# Patient Record
Sex: MALE | Race: WHITE | NOT HISPANIC OR LATINO | URBAN - METROPOLITAN AREA
[De-identification: names, ages, dates, MRNs, and addresses within clinical notes are randomized per-mention and may not be internally consistent; named-entity substitution may affect disease eponyms.]

---

## 2017-01-09 PROBLEM — D04.39 CARCINOMA IN SITU OF SKIN OF OTHER PARTS OF FACE: Status: ACTIVE | Noted: 2017-01-09

## 2017-05-29 PROBLEM — D49.2 NEOPLASM OF UNSPECIFIED BEHAVIOR OF BONE, SOFT TISSUE, AND SKIN: Status: RESOLVED | Noted: 2017-05-15 | Resolved: 2017-05-29

## 2017-10-23 ENCOUNTER — APPOINTMENT (RX ONLY)
Dept: URBAN - METROPOLITAN AREA CLINIC 4 | Facility: CLINIC | Age: 82
Setting detail: DERMATOLOGY
End: 2017-10-23

## 2017-10-23 DIAGNOSIS — L21.8 OTHER SEBORRHEIC DERMATITIS: ICD-10-CM

## 2017-10-23 DIAGNOSIS — L57.0 ACTINIC KERATOSIS: ICD-10-CM

## 2017-10-23 DIAGNOSIS — D485 NEOPLASM OF UNCERTAIN BEHAVIOR OF SKIN: ICD-10-CM

## 2017-10-23 PROBLEM — Z85.828 PERSONAL HISTORY OF OTHER MALIGNANT NEOPLASM OF SKIN: Status: ACTIVE | Noted: 2017-10-23

## 2017-10-23 PROBLEM — D48.5 NEOPLASM OF UNCERTAIN BEHAVIOR OF SKIN: Status: ACTIVE | Noted: 2017-10-23

## 2017-10-23 PROCEDURE — ? PRESCRIPTION SAMPLES PROVIDED

## 2017-10-23 PROCEDURE — 17003 DESTRUCT PREMALG LES 2-14: CPT

## 2017-10-23 PROCEDURE — 99213 OFFICE O/P EST LOW 20 MIN: CPT | Mod: 25

## 2017-10-23 PROCEDURE — 17000 DESTRUCT PREMALG LESION: CPT

## 2017-10-23 PROCEDURE — 11100: CPT | Mod: 59

## 2017-10-23 PROCEDURE — ? BIOPSY BY SHAVE METHOD

## 2017-10-23 PROCEDURE — ? LIQUID NITROGEN

## 2017-10-23 ASSESSMENT — LOCATION DETAILED DESCRIPTION DERM
LOCATION DETAILED: LEFT MEDIAL FOREHEAD
LOCATION DETAILED: RIGHT MEDIAL FRONTAL SCALP
LOCATION DETAILED: LEFT SUPERIOR FOREHEAD
LOCATION DETAILED: RIGHT INFERIOR CENTRAL MALAR CHEEK
LOCATION DETAILED: LEFT MEDIAL FRONTAL SCALP
LOCATION DETAILED: LEFT SUPERIOR PARIETAL SCALP
LOCATION DETAILED: RIGHT INFERIOR LATERAL MALAR CHEEK
LOCATION DETAILED: RIGHT SUPERIOR PARIETAL SCALP
LOCATION DETAILED: RIGHT FOREHEAD
LOCATION DETAILED: RIGHT CENTRAL FRONTAL SCALP
LOCATION DETAILED: RIGHT LATERAL MALAR CHEEK
LOCATION DETAILED: RIGHT CRUS OF HELIX

## 2017-10-23 ASSESSMENT — LOCATION SIMPLE DESCRIPTION DERM
LOCATION SIMPLE: LEFT FOREHEAD
LOCATION SIMPLE: SCALP
LOCATION SIMPLE: RIGHT SCALP
LOCATION SIMPLE: RIGHT FOREHEAD
LOCATION SIMPLE: LEFT SCALP
LOCATION SIMPLE: RIGHT EAR
LOCATION SIMPLE: RIGHT CHEEK

## 2017-10-23 ASSESSMENT — LOCATION ZONE DERM
LOCATION ZONE: SCALP
LOCATION ZONE: EAR
LOCATION ZONE: FACE

## 2017-10-23 NOTE — PROCEDURE: LIQUID NITROGEN
Detail Level: Detailed
Duration Of Freeze Thaw-Cycle (Seconds): 3
Post-Care Instructions: I reviewed with the patient in detail post-care instructions. Patient is to wear sunprotection, and avoid picking at any of the treated lesions. Pt may apply Vaseline to crusted or scabbing areas.
Consent: The patient's consent was obtained including but not limited to risks of crusting, scabbing, blistering, scarring, darker or lighter pigmentary change, recurrence, incomplete removal and infection.
Render Post-Care Instructions In Note?: no
Number Of Freeze-Thaw Cycles: 2 freeze-thaw cycles

## 2017-10-23 NOTE — PROCEDURE: BIOPSY BY SHAVE METHOD
Bill For Surgical Tray: no
Detail Level: Detailed
Dressing: bandage
Anesthesia Type: 1% lidocaine with epinephrine
Curettage Text: The wound bed was treated with curettage after the biopsy was performed.
X Size Of Lesion In Cm: 0
Lab Facility: 
Biopsy Type: H and E
Type Of Destruction Used: Electrodesiccation and Curettage
Wound Care: Vaseline
Anesthesia Volume In Cc: 0.5
Cryotherapy Text: The wound bed was treated with cryotherapy after the biopsy was performed.
Consent: Written consent was obtained and risks were reviewed including but not limited to scarring, infection, bleeding, scabbing, incomplete removal, nerve damage and allergy to anesthesia.
Hemostasis: Aluminum Chloride and Electrocautery
Electrodesiccation Text: The wound bed was treated with electrodesiccation after the biopsy was performed.
Silver Nitrate Text: The wound bed was treated with silver nitrate after the biopsy was performed.
Biopsy Method: Personna blade
Electrodesiccation And Curettage Text: The wound bed was treated with electrodesiccation and curettage after the biopsy was performed.
Billing Type: Third-Party Bill
Post-Care Instructions: I reviewed with the patient in detail post-care instructions. Patient is to keep the biopsy site dry overnight, and then apply bacitracin twice daily until healed. Patient may apply hydrogen peroxide soaks to remove any crusting.
Destruction After The Procedure: Yes
Notification Instructions: Patient will be notified of biopsy results. However, patient instructed to call the office if not contacted within 2 weeks.
Lab: [id number]

## 2017-10-23 NOTE — HPI: SKIN LESION (SQUAMOUS CELL CARCINOMA)
How Severe Is Your Skin Cancer?: mild
Is This A New Presentation, Or A Follow-Up?: Follow Up Squamous Cell Carcinoma
Additional History: Treated with ED&C at the time of biopsy.
Location From Outside Provider (Will Override Previously Chosen Location): Right frontal scalp Superior
When Was Squamous Cell Carcinoma Biopsied? (Optional): 5/15/2017
Accession # (Optional): [id number]

## 2017-10-23 NOTE — HPI: SKIN LESION (ACTINIC KERATOSES)
How Severe Are They?: mild
Is This A New Presentation, Or A Follow-Up?: Follow Up Actinic Keratoses
Additional History: Right frontal scalp inferior [id number], treated with ED&C at the time of biopsy.

## 2017-12-05 ENCOUNTER — APPOINTMENT (RX ONLY)
Dept: URBAN - METROPOLITAN AREA CLINIC 4 | Facility: CLINIC | Age: 82
Setting detail: DERMATOLOGY
End: 2017-12-05

## 2017-12-05 DIAGNOSIS — L57.0 ACTINIC KERATOSIS: ICD-10-CM

## 2017-12-05 PROCEDURE — ? LIQUID NITROGEN

## 2017-12-05 PROCEDURE — 17003 DESTRUCT PREMALG LES 2-14: CPT

## 2017-12-05 PROCEDURE — 17000 DESTRUCT PREMALG LESION: CPT

## 2017-12-05 ASSESSMENT — LOCATION DETAILED DESCRIPTION DERM
LOCATION DETAILED: LEFT SUPERIOR MEDIAL FOREHEAD
LOCATION DETAILED: RIGHT SUPERIOR MEDIAL FOREHEAD
LOCATION DETAILED: RIGHT MEDIAL FRONTAL SCALP
LOCATION DETAILED: RIGHT LATERAL MALAR CHEEK
LOCATION DETAILED: RIGHT CENTRAL FRONTAL SCALP

## 2017-12-05 ASSESSMENT — LOCATION SIMPLE DESCRIPTION DERM
LOCATION SIMPLE: LEFT FOREHEAD
LOCATION SIMPLE: RIGHT SCALP
LOCATION SIMPLE: RIGHT CHEEK
LOCATION SIMPLE: RIGHT FOREHEAD

## 2017-12-05 ASSESSMENT — LOCATION ZONE DERM
LOCATION ZONE: FACE
LOCATION ZONE: SCALP

## 2017-12-05 NOTE — PROCEDURE: LIQUID NITROGEN
Duration Of Freeze Thaw-Cycle (Seconds): 5
Render Post-Care Instructions In Note?: no
Consent: The patient's consent was obtained including but not limited to risks of crusting, scabbing, blistering, scarring, darker or lighter pigmentary change, recurrence, incomplete removal and infection.
Post-Care Instructions: I reviewed with the patient in detail post-care instructions. Patient is to wear sunprotection, and avoid picking at any of the treated lesions. Pt may apply Vaseline to crusted or scabbing areas.
Detail Level: Detailed
Number Of Freeze-Thaw Cycles: 2 freeze-thaw cycles
Number Of Freeze-Thaw Cycles: 1 freeze-thaw cycle

## 2018-01-01 ENCOUNTER — APPOINTMENT (OUTPATIENT)
Dept: HOSPICE | Facility: HOSPICE | Age: 83
End: 2018-01-01
Payer: MEDICARE

## 2018-01-01 ENCOUNTER — APPOINTMENT (OUTPATIENT)
Dept: RADIOLOGY | Facility: MEDICAL CENTER | Age: 83
End: 2018-01-01
Attending: EMERGENCY MEDICINE
Payer: MEDICARE

## 2018-01-01 ENCOUNTER — HOME CARE VISIT (OUTPATIENT)
Dept: HOSPICE | Facility: HOSPICE | Age: 83
End: 2018-01-01
Payer: MEDICARE

## 2018-01-01 ENCOUNTER — HOSPICE ADMISSION (OUTPATIENT)
Dept: HOSPICE | Facility: HOSPICE | Age: 83
End: 2018-01-01
Payer: MEDICARE

## 2018-01-01 ENCOUNTER — HOSPITAL ENCOUNTER (EMERGENCY)
Facility: MEDICAL CENTER | Age: 83
End: 2018-10-22
Attending: EMERGENCY MEDICINE
Payer: MEDICARE

## 2018-01-01 ENCOUNTER — HOSPITAL ENCOUNTER (OUTPATIENT)
Dept: LAB | Facility: MEDICAL CENTER | Age: 83
End: 2018-12-06
Attending: INTERNAL MEDICINE
Payer: COMMERCIAL

## 2018-01-01 ENCOUNTER — HOSPITAL ENCOUNTER (OUTPATIENT)
Dept: LAB | Facility: MEDICAL CENTER | Age: 83
End: 2018-08-11
Attending: NURSE PRACTITIONER
Payer: MEDICARE

## 2018-01-01 VITALS — HEART RATE: 75 BPM | RESPIRATION RATE: 22 BRPM

## 2018-01-01 VITALS — RESPIRATION RATE: 32 BRPM

## 2018-01-01 VITALS — HEART RATE: 72 BPM | SYSTOLIC BLOOD PRESSURE: 130 MMHG | RESPIRATION RATE: 28 BRPM | DIASTOLIC BLOOD PRESSURE: 74 MMHG

## 2018-01-01 VITALS — DIASTOLIC BLOOD PRESSURE: 76 MMHG | HEART RATE: 84 BPM | RESPIRATION RATE: 36 BRPM | SYSTOLIC BLOOD PRESSURE: 128 MMHG

## 2018-01-01 VITALS
RESPIRATION RATE: 24 BRPM | OXYGEN SATURATION: 84 % | SYSTOLIC BLOOD PRESSURE: 142 MMHG | HEART RATE: 78 BPM | DIASTOLIC BLOOD PRESSURE: 100 MMHG | TEMPERATURE: 98.3 F

## 2018-01-01 VITALS
OXYGEN SATURATION: 92 % | RESPIRATION RATE: 16 BRPM | HEART RATE: 80 BPM | DIASTOLIC BLOOD PRESSURE: 68 MMHG | SYSTOLIC BLOOD PRESSURE: 110 MMHG | TEMPERATURE: 101.3 F

## 2018-01-01 VITALS — DIASTOLIC BLOOD PRESSURE: 80 MMHG | SYSTOLIC BLOOD PRESSURE: 124 MMHG | HEART RATE: 78 BPM | RESPIRATION RATE: 18 BRPM

## 2018-01-01 VITALS — SYSTOLIC BLOOD PRESSURE: 110 MMHG | HEART RATE: 80 BPM | RESPIRATION RATE: 18 BRPM | DIASTOLIC BLOOD PRESSURE: 64 MMHG

## 2018-01-01 VITALS — SYSTOLIC BLOOD PRESSURE: 100 MMHG | HEART RATE: 68 BPM | DIASTOLIC BLOOD PRESSURE: 60 MMHG | RESPIRATION RATE: 20 BRPM

## 2018-01-01 VITALS — SYSTOLIC BLOOD PRESSURE: 116 MMHG | HEART RATE: 74 BPM | DIASTOLIC BLOOD PRESSURE: 68 MMHG | RESPIRATION RATE: 18 BRPM

## 2018-01-01 VITALS — RESPIRATION RATE: 18 BRPM

## 2018-01-01 VITALS — DIASTOLIC BLOOD PRESSURE: 64 MMHG | RESPIRATION RATE: 18 BRPM | SYSTOLIC BLOOD PRESSURE: 106 MMHG | HEART RATE: 86 BPM

## 2018-01-01 VITALS
OXYGEN SATURATION: 93 % | HEIGHT: 71 IN | TEMPERATURE: 98.8 F | RESPIRATION RATE: 17 BRPM | WEIGHT: 185 LBS | HEART RATE: 69 BPM | SYSTOLIC BLOOD PRESSURE: 102 MMHG | DIASTOLIC BLOOD PRESSURE: 71 MMHG | BODY MASS INDEX: 25.9 KG/M2

## 2018-01-01 VITALS — RESPIRATION RATE: 22 BRPM | HEART RATE: 78 BPM

## 2018-01-01 VITALS — RESPIRATION RATE: 18 BRPM | SYSTOLIC BLOOD PRESSURE: 140 MMHG | HEART RATE: 80 BPM | DIASTOLIC BLOOD PRESSURE: 80 MMHG

## 2018-01-01 VITALS — DIASTOLIC BLOOD PRESSURE: 72 MMHG | RESPIRATION RATE: 18 BRPM | SYSTOLIC BLOOD PRESSURE: 118 MMHG | HEART RATE: 110 BPM

## 2018-01-01 VITALS — RESPIRATION RATE: 20 BRPM | HEART RATE: 64 BPM

## 2018-01-01 VITALS — SYSTOLIC BLOOD PRESSURE: 140 MMHG | HEART RATE: 80 BPM | RESPIRATION RATE: 18 BRPM | DIASTOLIC BLOOD PRESSURE: 80 MMHG

## 2018-01-01 VITALS — RESPIRATION RATE: 16 BRPM | HEART RATE: 78 BPM

## 2018-01-01 VITALS — SYSTOLIC BLOOD PRESSURE: 130 MMHG | HEART RATE: 80 BPM | RESPIRATION RATE: 16 BRPM | DIASTOLIC BLOOD PRESSURE: 64 MMHG

## 2018-01-01 VITALS — RESPIRATION RATE: 22 BRPM | HEART RATE: 85 BPM

## 2018-01-01 VITALS — HEART RATE: 76 BPM | RESPIRATION RATE: 18 BRPM | DIASTOLIC BLOOD PRESSURE: 65 MMHG | SYSTOLIC BLOOD PRESSURE: 120 MMHG

## 2018-01-01 VITALS — HEART RATE: 78 BPM | RESPIRATION RATE: 18 BRPM | DIASTOLIC BLOOD PRESSURE: 58 MMHG | SYSTOLIC BLOOD PRESSURE: 125 MMHG

## 2018-01-01 DIAGNOSIS — R53.1 WEAKNESS: ICD-10-CM

## 2018-01-01 DIAGNOSIS — R79.89 ELEVATED TROPONIN I LEVEL: ICD-10-CM

## 2018-01-01 DIAGNOSIS — Z51.5 COMFORT MEASURES ONLY STATUS: ICD-10-CM

## 2018-01-01 DIAGNOSIS — E03.9 HYPOTHYROIDISM, UNSPECIFIED TYPE: ICD-10-CM

## 2018-01-01 LAB
25(OH)D3 SERPL-MCNC: 35 NG/ML (ref 30–100)
ALBUMIN SERPL BCP-MCNC: 3.2 G/DL (ref 3.2–4.9)
ALBUMIN SERPL BCP-MCNC: 3.6 G/DL (ref 3.2–4.9)
ALBUMIN/GLOB SERPL: 1.1 G/DL
ALBUMIN/GLOB SERPL: 1.4 G/DL
ALP SERPL-CCNC: 67 U/L (ref 30–99)
ALP SERPL-CCNC: 68 U/L (ref 30–99)
ALT SERPL-CCNC: 11 U/L (ref 2–50)
ALT SERPL-CCNC: 7 U/L (ref 2–50)
ANION GAP SERPL CALC-SCNC: 8 MMOL/L (ref 0–11.9)
ANION GAP SERPL CALC-SCNC: 8 MMOL/L (ref 0–11.9)
APPEARANCE UR: CLEAR
APTT PPP: 26.6 SEC (ref 24.7–36)
AST SERPL-CCNC: 12 U/L (ref 12–45)
AST SERPL-CCNC: 15 U/L (ref 12–45)
BASOPHILS # BLD AUTO: 0.7 % (ref 0–1.8)
BASOPHILS # BLD AUTO: 0.9 % (ref 0–1.8)
BASOPHILS # BLD: 0.04 K/UL (ref 0–0.12)
BASOPHILS # BLD: 0.08 K/UL (ref 0–0.12)
BILIRUB SERPL-MCNC: 0.6 MG/DL (ref 0.1–1.5)
BILIRUB SERPL-MCNC: 0.7 MG/DL (ref 0.1–1.5)
BNP SERPL-MCNC: 69 PG/ML (ref 0–100)
BUN SERPL-MCNC: 17 MG/DL (ref 8–22)
BUN SERPL-MCNC: 18 MG/DL (ref 8–22)
CALCIUM SERPL-MCNC: 8.6 MG/DL (ref 8.5–10.5)
CALCIUM SERPL-MCNC: 8.7 MG/DL (ref 8.5–10.5)
CHLORIDE SERPL-SCNC: 106 MMOL/L (ref 96–112)
CHLORIDE SERPL-SCNC: 106 MMOL/L (ref 96–112)
CO2 SERPL-SCNC: 25 MMOL/L (ref 20–33)
CO2 SERPL-SCNC: 27 MMOL/L (ref 20–33)
COLOR UR: YELLOW
CREAT SERPL-MCNC: 1.1 MG/DL (ref 0.5–1.4)
CREAT SERPL-MCNC: 1.18 MG/DL (ref 0.5–1.4)
EOSINOPHIL # BLD AUTO: 0.4 K/UL (ref 0–0.51)
EOSINOPHIL # BLD AUTO: 0.65 K/UL (ref 0–0.51)
EOSINOPHIL NFR BLD: 7.2 % (ref 0–6.9)
EOSINOPHIL NFR BLD: 7.3 % (ref 0–6.9)
ERYTHROCYTE [DISTWIDTH] IN BLOOD BY AUTOMATED COUNT: 47.9 FL (ref 35.9–50)
ERYTHROCYTE [DISTWIDTH] IN BLOOD BY AUTOMATED COUNT: 47.9 FL (ref 35.9–50)
GLOBULIN SER CALC-MCNC: 2.5 G/DL (ref 1.9–3.5)
GLOBULIN SER CALC-MCNC: 3 G/DL (ref 1.9–3.5)
GLUCOSE SERPL-MCNC: 112 MG/DL (ref 65–99)
GLUCOSE SERPL-MCNC: 97 MG/DL (ref 65–99)
GLUCOSE UR STRIP.AUTO-MCNC: NEGATIVE MG/DL
HCT VFR BLD AUTO: 43.5 % (ref 42–52)
HCT VFR BLD AUTO: 44.5 % (ref 42–52)
HGB BLD-MCNC: 14.3 G/DL (ref 14–18)
HGB BLD-MCNC: 15.1 G/DL (ref 14–18)
IMM GRANULOCYTES # BLD AUTO: 0.01 K/UL (ref 0–0.11)
IMM GRANULOCYTES # BLD AUTO: 0.04 K/UL (ref 0–0.11)
IMM GRANULOCYTES NFR BLD AUTO: 0.2 % (ref 0–0.9)
IMM GRANULOCYTES NFR BLD AUTO: 0.4 % (ref 0–0.9)
INR PPP: 1.14 (ref 0.87–1.13)
KETONES UR STRIP.AUTO-MCNC: NEGATIVE MG/DL
LEUKOCYTE ESTERASE UR QL STRIP.AUTO: NEGATIVE
LYMPHOCYTES # BLD AUTO: 0.75 K/UL (ref 1–4.8)
LYMPHOCYTES # BLD AUTO: 0.83 K/UL (ref 1–4.8)
LYMPHOCYTES NFR BLD: 13.7 % (ref 22–41)
LYMPHOCYTES NFR BLD: 9.1 % (ref 22–41)
MCH RBC QN AUTO: 30.6 PG (ref 27–33)
MCH RBC QN AUTO: 31.1 PG (ref 27–33)
MCHC RBC AUTO-ENTMCNC: 32.9 G/DL (ref 33.7–35.3)
MCHC RBC AUTO-ENTMCNC: 33.9 G/DL (ref 33.7–35.3)
MCV RBC AUTO: 91.6 FL (ref 81.4–97.8)
MCV RBC AUTO: 92.9 FL (ref 81.4–97.8)
MICRO URNS: NORMAL
MONOCYTES # BLD AUTO: 0.93 K/UL (ref 0–0.85)
MONOCYTES # BLD AUTO: 1.85 K/UL (ref 0–0.85)
MONOCYTES NFR BLD AUTO: 17 % (ref 0–13.4)
MONOCYTES NFR BLD AUTO: 20.4 % (ref 0–13.4)
NEUTROPHILS # BLD AUTO: 3.35 K/UL (ref 1.82–7.42)
NEUTROPHILS # BLD AUTO: 5.64 K/UL (ref 1.82–7.42)
NEUTROPHILS NFR BLD: 61.1 % (ref 44–72)
NEUTROPHILS NFR BLD: 62 % (ref 44–72)
NITRITE UR QL STRIP.AUTO: NEGATIVE
NRBC # BLD AUTO: 0 K/UL
NRBC # BLD AUTO: 0 K/UL
NRBC BLD-RTO: 0 /100 WBC
NRBC BLD-RTO: 0 /100 WBC
PH UR STRIP.AUTO: 6 [PH]
PLATELET # BLD AUTO: 203 K/UL (ref 164–446)
PLATELET # BLD AUTO: 217 K/UL (ref 164–446)
PMV BLD AUTO: 9.9 FL (ref 9–12.9)
PMV BLD AUTO: 9.9 FL (ref 9–12.9)
POTASSIUM SERPL-SCNC: 3.9 MMOL/L (ref 3.6–5.5)
POTASSIUM SERPL-SCNC: 4.2 MMOL/L (ref 3.6–5.5)
PROT SERPL-MCNC: 6.1 G/DL (ref 6–8.2)
PROT SERPL-MCNC: 6.2 G/DL (ref 6–8.2)
PROT UR QL STRIP: NEGATIVE MG/DL
PROTHROMBIN TIME: 14.8 SEC (ref 12–14.6)
RBC # BLD AUTO: 4.68 M/UL (ref 4.7–6.1)
RBC # BLD AUTO: 4.86 M/UL (ref 4.7–6.1)
RBC UR QL AUTO: NEGATIVE
SODIUM SERPL-SCNC: 139 MMOL/L (ref 135–145)
SODIUM SERPL-SCNC: 141 MMOL/L (ref 135–145)
SP GR UR STRIP.AUTO: 1.03
TROPONIN I SERPL-MCNC: 0.18 NG/ML (ref 0–0.04)
TSH SERPL DL<=0.005 MIU/L-ACNC: 12.87 UIU/ML (ref 0.38–5.33)
TSH SERPL DL<=0.005 MIU/L-ACNC: 2.14 UIU/ML (ref 0.38–5.33)
VIT B12 SERPL-MCNC: 703 PG/ML (ref 211–911)
WBC # BLD AUTO: 5.5 K/UL (ref 4.8–10.8)
WBC # BLD AUTO: 9.1 K/UL (ref 4.8–10.8)

## 2018-01-01 PROCEDURE — S9126 HOSPICE CARE, IN THE HOME, P: HCPCS

## 2018-01-01 PROCEDURE — G0299 HHS/HOSPICE OF RN EA 15 MIN: HCPCS

## 2018-01-01 PROCEDURE — 80053 COMPREHEN METABOLIC PANEL: CPT

## 2018-01-01 PROCEDURE — G0155 HHCP-SVS OF CSW,EA 15 MIN: HCPCS

## 2018-01-01 PROCEDURE — G0156 HHCP-SVS OF AIDE,EA 15 MIN: HCPCS

## 2018-01-01 PROCEDURE — 81003 URINALYSIS AUTO W/O SCOPE: CPT

## 2018-01-01 PROCEDURE — 99284 EMERGENCY DEPT VISIT MOD MDM: CPT | Mod: 25

## 2018-01-01 PROCEDURE — 82607 VITAMIN B-12: CPT

## 2018-01-01 PROCEDURE — 83880 ASSAY OF NATRIURETIC PEPTIDE: CPT

## 2018-01-01 PROCEDURE — 665036 HSPC NOTICE OF ELECTION NOE

## 2018-01-01 PROCEDURE — 82306 VITAMIN D 25 HYDROXY: CPT | Mod: GZ

## 2018-01-01 PROCEDURE — 85730 THROMBOPLASTIN TIME PARTIAL: CPT

## 2018-01-01 PROCEDURE — 85025 COMPLETE CBC W/AUTO DIFF WBC: CPT

## 2018-01-01 PROCEDURE — 36415 COLL VENOUS BLD VENIPUNCTURE: CPT

## 2018-01-01 PROCEDURE — 304561 HCHG STAT O2

## 2018-01-01 PROCEDURE — 84443 ASSAY THYROID STIM HORMONE: CPT

## 2018-01-01 PROCEDURE — 84484 ASSAY OF TROPONIN QUANT: CPT

## 2018-01-01 PROCEDURE — 70450 CT HEAD/BRAIN W/O DYE: CPT

## 2018-01-01 PROCEDURE — 6650990 HC HOSPICE AND HOME CARE RX REV CODE 0250

## 2018-01-01 PROCEDURE — 71045 X-RAY EXAM CHEST 1 VIEW: CPT

## 2018-01-01 PROCEDURE — 85610 PROTHROMBIN TIME: CPT

## 2018-01-01 RX ORDER — CHOLESTYRAMINE LIGHT 4 G/5.7G
POWDER, FOR SUSPENSION ORAL 2 TIMES DAILY
COMMUNITY
End: 2018-01-01

## 2018-01-01 RX ORDER — LEVOTHYROXINE SODIUM 137 UG/1
137 TABLET ORAL
COMMUNITY
End: 2019-01-01

## 2018-01-01 RX ORDER — ASPIRIN 81 MG/1
81 TABLET, CHEWABLE ORAL DAILY
COMMUNITY
End: 2018-01-01

## 2018-01-01 RX ORDER — UBIDECARENONE 75 MG
100 CAPSULE ORAL DAILY
COMMUNITY
End: 2018-01-01

## 2018-01-01 RX ORDER — FLURAZEPAM HCL 15 MG
15 CAPSULE ORAL NIGHTLY PRN
COMMUNITY
End: 2019-01-01

## 2018-01-01 RX ORDER — DOCUSATE SODIUM 100 MG/1
100 CAPSULE, LIQUID FILLED ORAL 2 TIMES DAILY
COMMUNITY
End: 2018-01-01

## 2018-01-01 RX ORDER — ALPRAZOLAM 0.25 MG/1
0.25 TABLET ORAL NIGHTLY PRN
COMMUNITY
End: 2019-01-01

## 2018-01-01 RX ORDER — IBUPROFEN 800 MG/1
800 TABLET ORAL EVERY 8 HOURS PRN
COMMUNITY
End: 2019-01-01

## 2018-01-01 RX ORDER — FINASTERIDE 5 MG/1
5 TABLET, FILM COATED ORAL DAILY
COMMUNITY
End: 2018-01-01

## 2018-01-01 RX ORDER — KETOCONAZOLE 20 MG/G
1 CREAM TOPICAL 2 TIMES DAILY
COMMUNITY

## 2018-01-01 RX ORDER — ONDANSETRON 4 MG/1
4 TABLET, ORALLY DISINTEGRATING ORAL EVERY 6 HOURS PRN
COMMUNITY

## 2018-01-01 RX ORDER — HYDROCODONE BITARTRATE AND ACETAMINOPHEN 10; 325 MG/1; MG/1
1 TABLET ORAL EVERY 6 HOURS PRN
COMMUNITY
End: 2018-01-01

## 2018-01-01 RX ORDER — CHLORHEXIDINE GLUCONATE ORAL RINSE 1.2 MG/ML
15 SOLUTION DENTAL 2 TIMES DAILY
COMMUNITY
End: 2018-01-01

## 2018-01-01 RX ORDER — TAMSULOSIN HYDROCHLORIDE 0.4 MG/1
0.4 CAPSULE ORAL
COMMUNITY
End: 2018-01-01

## 2018-01-01 RX ORDER — TRAZODONE HYDROCHLORIDE 100 MG/1
100 TABLET ORAL
COMMUNITY
End: 2019-01-01

## 2018-01-01 SDOH — ECONOMIC STABILITY: HOUSING INSECURITY: UNSAFE APPLIANCES: 0

## 2018-01-01 SDOH — ECONOMIC STABILITY: HOUSING INSECURITY: UNSAFE COOKING RANGE AREA: 0

## 2018-01-01 SDOH — ECONOMIC STABILITY: HOUSING INSECURITY: HOME SAFETY: N TUBING

## 2018-01-01 SDOH — ECONOMIC STABILITY: HOUSING INSECURITY
HOME SAFETY: OXYGEN ASSESSMENT COMPLETED.   THE FOLLOWING OXYGEN SAFETY EDUCATION WAS PROVIDED:  NO OPEN FLAMES  POST 'OXYGEN IN USE' SIGN ON EXTERNAL DOOR OF HOME  NEED FUNCTIONAL FIRE EXTINGUISHER AND SMOKE DETECTORS IN HOME  KEEP LIQUIDS THAT MAY CATCH FIRE AW

## 2018-01-01 SDOH — ECONOMIC STABILITY: GENERAL

## 2018-01-01 SDOH — ECONOMIC STABILITY: HOUSING INSECURITY
HOME SAFETY: AY FROM YOUR OXYGEN. THIS INCLUDES CLEANING PRODUCTS THAT CONTAIN OIL, GREASE, ALCOHOL, OR OTHER LIQUIDS THAT CAN BURN  DO NOT USE VASELINE OR OTHER PETROLEUM-BASED CREAMS AND LOTIONS ON YOUR FACE OR UPPER PART OF YOUR BODY  AVOID TRIPPING OVER OXYGE

## 2018-01-01 ASSESSMENT — ENCOUNTER SYMPTOMS
LOWER EXTREMITY EDEMA: 1
BOWEL INCONTINENCE: 1
BOWEL INCONTINENCE: 1
SPUTUM PRODUCTION: 1
FATIGUES EASILY: 1
LOWER EXTREMITY EDEMA: 1
DYSPNEA ON EXERTION: 1
STOOL FREQUENCY: LESS THAN DAILY
FORGETFULNESS: 1
COUGH CHARACTERISTICS: PRODUCTIVE
SPUTUM PRODUCTION: 1
COUGH: 1
SOMNOLENCE: 1
FATIGUES EASILY: 1
DYSPNEA ON EXERTION: 1
FATIGUES EASILY: 1
SPUTUM AMOUNT: MODERATE
DESCRIPTION OF MEMORY LOSS: SHORT TERM
DYSPNEA ON EXERTION: 1
DEPRESSED MOOD: 1
FORGETFULNESS: 1
SHORTNESS OF BREATH: 1
BOWEL INCONTINENCE: 1
COUGH: 1
SPUTUM COLOR: CLEAR
SPUTUM AMOUNT: MODERATE
FATIGUES EASILY: 1
SPUTUM PRODUCTION: 1
COUGH CHARACTERISTICS: STRONG
COUGH: 1
COUGH CHARACTERISTICS: PRODUCTIVE
BOWEL INCONTINENCE: 1
FORGETFULNESS: 1
DESCRIPTION OF MEMORY LOSS: SHORT TERM
BOWEL INCONTINENCE: 1
FATIGUES EASILY: 1
SPUTUM COLOR: CLEAR
LOWER EXTREMITY EDEMA: 1
STOOL FREQUENCY: LESS THAN DAILY
BOWEL INCONTINENCE: 1
STOOL FREQUENCY: LESS THAN DAILY
DEPRESSED MOOD: 1
BOWEL INCONTINENCE: 1
COUGH: 1
DESCRIPTION OF MEMORY LOSS: SHORT TERM
SPUTUM CONSISTENCY: THICK
FATIGUES EASILY: 1
SLEEP QUALITY: ADEQUATE
SPUTUM COLOR: CLEAR
SOMNOLENCE: 1
SLEEP QUALITY: ADEQUATE
SOMNOLENCE: 1
SOMNOLENCE: 1
COUGH CHARACTERISTICS: PRODUCTIVE
FATIGUE: 1
COUGH CHARACTERISTICS: PRODUCTIVE
FATIGUE: 1
INTRACTABLE COUGH: 1
SLEEP QUALITY: FAIR
SLEEP QUALITY: ADEQUATE
SPUTUM PRODUCTION: 1
FATIGUE: 1
FATIGUE: 1
SPUTUM COLOR: CLEAR
SPUTUM AMOUNT: MODERATE
COUGH CHARACTERISTICS: CONGESTED
DYSPNEA ACTIVITY LEVEL: WHILE SPEAKING
FATIGUES EASILY: 1
LOWER EXTREMITY EDEMA: 1
SPUTUM COLOR: CLEAR
FATIGUES EASILY: 1
SHORTNESS OF BREATH: 1
SPUTUM AMOUNT: COPIOUS
COUGH CHARACTERISTICS: PRODUCTIVE
DYSPNEA ACTIVITY LEVEL: AT REST
DESCRIPTION OF MEMORY LOSS: SHORT TERM
SHORTNESS OF BREATH: 1
SPUTUM PRODUCTION: 1
FATIGUE: 1
SPUTUM COLOR: WHITE
SPUTUM CONSISTENCY: THICK
FORGETFULNESS: 1
SLEEP QUALITY: ADEQUATE
SPUTUM CONSISTENCY: THICK
FORGETFULNESS: 1
FATIGUES EASILY: 1
SLEEP QUALITY: ADEQUATE
SPUTUM CONSISTENCY: THICK
COUGH: 1
FATIGUES EASILY: 1
DESCRIPTION OF MEMORY LOSS: SHORT TERM
CHANGE IN LEVEL OF CONSCIOUSNESS: 1
SPUTUM CONSISTENCY: THICK
BOWEL INCONTINENCE: 1
LAST BOWEL MOVEMENT: 65013
DESCRIPTION OF MEMORY LOSS: SHORT TERM
SOMNOLENCE: 1
FORGETFULNESS: 1
FORGETFULNESS: 1
COUGH CHARACTERISTICS: WEAK
SPUTUM PRODUCTION: 1
SLEEP QUALITY: ADEQUATE
COUGH: 1
LOWER EXTREMITY EDEMA: 1
DYSPNEA ON EXERTION: 1
FATIGUES EASILY: 1
COUGH CHARACTERISTICS: NON-PRODUCTIVE
SPUTUM PRODUCTION: 1
SOMNOLENCE: 1
STOOL FREQUENCY: LESS THAN DAILY
SLEEP QUALITY: FAIR
SPUTUM CONSISTENCY: THICK
FATIGUE: 1
COUGH: 1
COUGH: 1
DYSPNEA ACTIVITY LEVEL: WHILE EATING
DESCRIPTION OF MEMORY LOSS: IMMEDIATE
FORGETFULNESS: 1
SOMNOLENCE: 1
SLEEP QUALITY: ADEQUATE
SHORTNESS OF BREATH: 1
FATIGUES EASILY: 1
SPUTUM AMOUNT: SCANT
FATIGUE: 1
FORGETFULNESS: 1
LOWER EXTREMITY EDEMA: 1
DYSPNEA ON EXERTION: 1
LOWER EXTREMITY EDEMA: 1
FATIGUE: 1
FATIGUE: 1
DESCRIPTION OF MEMORY LOSS: IMMEDIATE
SOMNOLENCE: 1
FATIGUE: 1
SOMNOLENCE: 1
DEPRESSED MOOD: 1
LOWER EXTREMITY EDEMA: 1
STOOL FREQUENCY: LESS THAN DAILY
SPUTUM COLOR: CLEAR
SLEEP QUALITY: ADEQUATE
COUGH: 1
LOWER EXTREMITY EDEMA: 1
FATIGUES EASILY: 1
LOWER EXTREMITY EDEMA: 1
FATIGUE: 1
LOWER EXTREMITY EDEMA: 1
DYSPNEA ON EXERTION: 1
FATIGUES EASILY: 1
COUGH CHARACTERISTICS: MOIST
DYSPNEA ACTIVITY LEVEL: AT REST
DYSPNEA ON EXERTION: 1
LAST BOWEL MOVEMENT: 64946
STOOL FREQUENCY: TWICE DAILY
FATIGUE: 1
FORGETFULNESS: 1
DYSPNEA ON EXERTION: 1
BOWEL INCONTINENCE: 1
LAST BOWEL MOVEMENT: 64984
SLEEP QUALITY: ADEQUATE
SLEEP QUALITY: ADEQUATE
STOOL FREQUENCY: LESS THAN DAILY
DYSPNEA ON EXERTION: 1
BOWEL INCONTINENCE: 1
DESCRIPTION OF MEMORY LOSS: SHORT TERM
FATIGUE: 1
FATIGUE: 1
STOOL FREQUENCY: LESS THAN DAILY
FORGETFULNESS: 1
BOWEL INCONTINENCE: 1
FORGETFULNESS: 1
DYSPNEA ACTIVITY LEVEL: AT REST
SOMNOLENCE: 1
COUGH CHARACTERISTICS: PRODUCTIVE
SPUTUM AMOUNT: MODERATE
FATIGUES EASILY: 1
SPUTUM PRODUCTION: 1
SLEEP QUALITY: ADEQUATE
SPUTUM AMOUNT: MODERATE
SPUTUM PRODUCTION: 1
BOWEL INCONTINENCE: 1
DESCRIPTION OF MEMORY LOSS: SHORT TERM

## 2018-01-01 ASSESSMENT — ACTIVITIES OF DAILY LIVING (ADL)
MONEY MANAGEMENT (EXPENSES/BILLS): TOTALLY DEPENDENT
PHYSICAL_TRANSFER_REQUIRES_ASSISTANCE: 1
MONEY MANAGEMENT (EXPENSES/BILLS): TOTALLY DEPENDENT
MONEY MANAGEMENT (EXPENSES/BILLS): TOTALLY DEPENDENT
EATING_REQUIRES_ASSISTANCE: 1
CONTINENCE_REQUIRES_ASSISTANCE: 1
MONEY MANAGEMENT (EXPENSES/BILLS): TOTALLY DEPENDENT
BATHING_REQUIRES_ASSISTANCE: 1
DRESSING_REQUIRES_ASSISTANCE: 1
MONEY MANAGEMENT (EXPENSES/BILLS): TOTALLY DEPENDENT

## 2018-01-01 ASSESSMENT — SOCIAL DETERMINANTS OF HEALTH (SDOH)
ACTIVE STRESSOR - LOSS OF CONTROL: 1
ACTIVE STRESSOR - HEALTH CHANGES: 1
ACTIVE STRESSOR - LOSS OF CONTROL: 1
ACTIVE STRESSOR - LOSS OF CONTROL: 1
ACTIVE STRESSOR - EXPRESSED EMOTIONAL NEED: 1
ACTIVE STRESSOR - LOSS OF CONTROL: 1
ACTIVE STRESSOR - EXPRESSED EMOTIONAL NEED: 1
ACTIVE STRESSOR - LOSS OF CONTROL: 1
ACTIVE STRESSOR - NO STRESS FACTORS: 1
ACTIVE STRESSOR - LOSS OF CONTROL: 1
IS CONCERNED ABOUT INCOME: N
ACTIVE STRESSOR - EXPRESSED EMOTIONAL NEED: 1
ACTIVE STRESSOR - EXPRESSED EMOTIONAL NEED: 1

## 2018-07-31 ENCOUNTER — APPOINTMENT (RX ONLY)
Dept: URBAN - METROPOLITAN AREA CLINIC 4 | Facility: CLINIC | Age: 83
Setting detail: DERMATOLOGY
End: 2018-07-31

## 2018-07-31 DIAGNOSIS — L21.8 OTHER SEBORRHEIC DERMATITIS: ICD-10-CM

## 2018-07-31 DIAGNOSIS — L57.0 ACTINIC KERATOSIS: ICD-10-CM

## 2018-07-31 DIAGNOSIS — L81.4 OTHER MELANIN HYPERPIGMENTATION: ICD-10-CM

## 2018-07-31 PROBLEM — N40.0 BENIGN PROSTATIC HYPERPLASIA WITHOUT LOWER URINARY TRACT SYMPTOMS: Status: ACTIVE | Noted: 2018-07-31

## 2018-07-31 PROBLEM — E03.9 HYPOTHYROIDISM, UNSPECIFIED: Status: ACTIVE | Noted: 2018-07-31

## 2018-07-31 PROBLEM — I10 ESSENTIAL (PRIMARY) HYPERTENSION: Status: ACTIVE | Noted: 2018-07-31

## 2018-07-31 PROBLEM — K21.9 GASTRO-ESOPHAGEAL REFLUX DISEASE WITHOUT ESOPHAGITIS: Status: ACTIVE | Noted: 2018-07-31

## 2018-07-31 PROBLEM — D48.5 NEOPLASM OF UNCERTAIN BEHAVIOR OF SKIN: Status: ACTIVE | Noted: 2018-07-31

## 2018-07-31 PROBLEM — E78.5 HYPERLIPIDEMIA, UNSPECIFIED: Status: ACTIVE | Noted: 2018-07-31

## 2018-07-31 PROCEDURE — 17003 DESTRUCT PREMALG LES 2-14: CPT

## 2018-07-31 PROCEDURE — 11101: CPT

## 2018-07-31 PROCEDURE — ? BIOPSY BY SHAVE METHOD

## 2018-07-31 PROCEDURE — 17000 DESTRUCT PREMALG LESION: CPT

## 2018-07-31 PROCEDURE — ? COUNSELING

## 2018-07-31 PROCEDURE — ? LIQUID NITROGEN

## 2018-07-31 PROCEDURE — 11100: CPT | Mod: 59

## 2018-07-31 PROCEDURE — ? BIOPSY BY SHAVE METHOD AND DESTRUCTION

## 2018-07-31 PROCEDURE — 99213 OFFICE O/P EST LOW 20 MIN: CPT | Mod: 25

## 2018-07-31 PROCEDURE — ? PRESCRIPTION

## 2018-07-31 RX ORDER — KETOCONAZOLE 20 MG/G
1 CREAM TOPICAL BID
Qty: 1 | Refills: 3 | Status: ERX | COMMUNITY
Start: 2018-07-31

## 2018-07-31 RX ADMIN — KETOCONAZOLE 1: 20 CREAM TOPICAL at 15:53

## 2018-07-31 ASSESSMENT — LOCATION SIMPLE DESCRIPTION DERM
LOCATION SIMPLE: RIGHT FOREHEAD
LOCATION SIMPLE: RIGHT SCALP
LOCATION SIMPLE: RIGHT TEMPLE
LOCATION SIMPLE: RIGHT MIDDLE FINGER
LOCATION SIMPLE: LEFT ZYGOMA
LOCATION SIMPLE: RIGHT EAR
LOCATION SIMPLE: SCALP
LOCATION SIMPLE: LEFT EAR
LOCATION SIMPLE: ANTERIOR SCALP
LOCATION SIMPLE: SUPERIOR FOREHEAD

## 2018-07-31 ASSESSMENT — LOCATION DETAILED DESCRIPTION DERM
LOCATION DETAILED: LEFT CENTRAL PARIETAL SCALP
LOCATION DETAILED: RIGHT SCAPHA
LOCATION DETAILED: LEFT SUPERIOR PARIETAL SCALP
LOCATION DETAILED: SUPERIOR MID FOREHEAD
LOCATION DETAILED: LEFT CENTRAL ZYGOMA
LOCATION DETAILED: MID-FRONTAL SCALP
LOCATION DETAILED: RIGHT PROXIMAL DORSAL MIDDLE FINGER
LOCATION DETAILED: RIGHT CENTRAL TEMPLE
LOCATION DETAILED: RIGHT SUPERIOR FOREHEAD
LOCATION DETAILED: RIGHT CENTRAL FRONTAL SCALP
LOCATION DETAILED: LEFT CENTRAL FRONTAL SCALP
LOCATION DETAILED: LEFT SUPERIOR HELIX

## 2018-07-31 ASSESSMENT — LOCATION ZONE DERM
LOCATION ZONE: EAR
LOCATION ZONE: FINGER
LOCATION ZONE: SCALP
LOCATION ZONE: FACE

## 2018-07-31 NOTE — PROCEDURE: LIQUID NITROGEN
Render Post-Care Instructions In Note?: no
Duration Of Freeze Thaw-Cycle (Seconds): 3
Detail Level: Simple
Number Of Freeze-Thaw Cycles: 2 freeze-thaw cycles
Consent: The patient's consent was obtained including but not limited to risks of crusting, scabbing, blistering, scarring, darker or lighter pigmentary change, recurrence, incomplete removal and infection.
Post-Care Instructions: I reviewed with the patient in detail post-care instructions. Patient is to wear sunprotection, and avoid picking at any of the treated lesions. Pt may apply Vaseline to crusted or scabbing areas.

## 2018-07-31 NOTE — PROCEDURE: BIOPSY BY SHAVE METHOD AND DESTRUCTION
Consent: Written consent was obtained and risks were reviewed including but not limited to scarring, infection, bleeding, scabbing, incomplete removal, nerve damage and allergy to anesthesia.
Bill For Surgical Tray: no
Biopsy Type: H and E
Detail Level: Detailed
Hemostasis: Drysol
Lab: [id number]
Billing Type: Third-Party Bill
Post-Care Instructions: I reviewed with the patient in detail post-care instructions. Patient is to keep the biopsy site dry overnight, and then apply bacitracin twice daily until healed. Patient may apply hydrogen peroxide soaks to remove any crusting.
Anesthesia Volume In Cc: 2
Notification Instructions: Patient will be notified of biopsy results. However, patient instructed to call the office if not contacted within 2 weeks.
Lab Facility: 
Anesthesia Type: 1% lidocaine with epinephrine
Number Of Curettages: 3
Wound Care: Petrolatum
Size Of Lesion In Cm (Optional): 0
Bill As?: Biopsy by Shave Method
Destruction Type: electrodesiccation

## 2018-07-31 NOTE — HPI: SKIN LESIONS
Is This A New Presentation, Or A Follow-Up?: Skin Lesions
How Severe Is Your Skin Lesion?: moderate
Have Your Skin Lesions Been Treated?: not been treated
Additional History: Lesions on face,scalp,and arms.

## 2018-07-31 NOTE — PROCEDURE: BIOPSY BY SHAVE METHOD
Cryotherapy Text: The wound bed was treated with cryotherapy after the biopsy was performed.
Wound Care: Bacitracin
Dressing: bandage
Was A Bandage Applied: Yes
Electrodesiccation And Curettage Text: The wound bed was treated with electrodesiccation and curettage after the biopsy was performed.
Hemostasis: Drysol
Billing Type: Third-Party Bill
Biopsy Type: H and E
Biopsy Method: Personna blade
Curettage Text: The wound bed was treated with curettage after the biopsy was performed.
Size Of Lesion In Cm: 0
Depth Of Biopsy: dermis
Lab Facility: 
Lab: [id number]
Detail Level: Detailed
Render Post-Care Instructions In Note?: no
Silver Nitrate Text: The wound bed was treated with silver nitrate after the biopsy was performed.
Notification Instructions: Patient will be notified of biopsy results. However, patient instructed to call the office if not contacted within 2 weeks.
Type Of Destruction Used: Curettage
Electrodesiccation Text: The wound bed was treated with electrodesiccation after the biopsy was performed.
Anesthesia Type: 1% lidocaine with epinephrine
Consent: Written consent was obtained and risks were reviewed including but not limited to scarring, infection, bleeding, scabbing, incomplete removal, nerve damage and allergy to anesthesia.
Post-Care Instructions: I reviewed with the patient in detail post-care instructions. Patient is to keep the biopsy site dry overnight, and then apply bacitracin twice daily until healed. Patient may apply hydrogen peroxide soaks to remove any crusting.
Anesthesia Volume In Cc: 2

## 2018-08-01 ENCOUNTER — RX ONLY (OUTPATIENT)
Age: 83
Setting detail: RX ONLY
End: 2018-08-01

## 2018-08-01 RX ORDER — KETOCONAZOLE 20 MG/G
1 CREAM TOPICAL BID
Qty: 1 | Refills: 5 | Status: ERX

## 2018-09-18 ENCOUNTER — APPOINTMENT (RX ONLY)
Dept: URBAN - METROPOLITAN AREA CLINIC 4 | Facility: CLINIC | Age: 83
Setting detail: DERMATOLOGY
End: 2018-09-18

## 2018-09-18 DIAGNOSIS — L57.0 ACTINIC KERATOSIS: ICD-10-CM

## 2018-09-18 DIAGNOSIS — L21.8 OTHER SEBORRHEIC DERMATITIS: ICD-10-CM

## 2018-09-18 PROCEDURE — 99213 OFFICE O/P EST LOW 20 MIN: CPT | Mod: 25

## 2018-09-18 PROCEDURE — ? LIQUID NITROGEN

## 2018-09-18 PROCEDURE — ? COUNSELING

## 2018-09-18 PROCEDURE — 17003 DESTRUCT PREMALG LES 2-14: CPT

## 2018-09-18 PROCEDURE — 17000 DESTRUCT PREMALG LESION: CPT

## 2018-09-18 ASSESSMENT — LOCATION SIMPLE DESCRIPTION DERM
LOCATION SIMPLE: FRONTAL SCALP
LOCATION SIMPLE: RIGHT TEMPLE
LOCATION SIMPLE: RIGHT FOREHEAD
LOCATION SIMPLE: LEFT EYEBROW
LOCATION SIMPLE: RIGHT EYEBROW
LOCATION SIMPLE: RIGHT CHEEK
LOCATION SIMPLE: LEFT ZYGOMA
LOCATION SIMPLE: LEFT CHEEK

## 2018-09-18 ASSESSMENT — LOCATION ZONE DERM
LOCATION ZONE: FACE
LOCATION ZONE: SCALP

## 2018-09-18 ASSESSMENT — LOCATION DETAILED DESCRIPTION DERM
LOCATION DETAILED: LEFT CENTRAL EYEBROW
LOCATION DETAILED: MEDIAL FRONTAL SCALP
LOCATION DETAILED: RIGHT LATERAL TEMPLE
LOCATION DETAILED: RIGHT CENTRAL EYEBROW
LOCATION DETAILED: RIGHT INFERIOR FOREHEAD
LOCATION DETAILED: LEFT CENTRAL ZYGOMA
LOCATION DETAILED: LEFT INFERIOR CENTRAL MALAR CHEEK
LOCATION DETAILED: RIGHT INFERIOR PREAURICULAR CHEEK
LOCATION DETAILED: RIGHT SUPERIOR FOREHEAD
LOCATION DETAILED: RIGHT LATERAL SUBMANDIBULAR CHEEK

## 2018-09-18 NOTE — PROCEDURE: LIQUID NITROGEN
Detail Level: Simple
Duration Of Freeze Thaw-Cycle (Seconds): 3
Post-Care Instructions: I reviewed with the patient in detail post-care instructions. Patient is to wear sunprotection, and avoid picking at any of the treated lesions. Pt may apply Vaseline to crusted or scabbing areas.
Consent: The patient's consent was obtained including but not limited to risks of crusting, scabbing, blistering, scarring, darker or lighter pigmentary change, recurrence, incomplete removal and infection.
Number Of Freeze-Thaw Cycles: 2 freeze-thaw cycles
Render Post-Care Instructions In Note?: no

## 2018-10-22 NOTE — ED PROVIDER NOTES
ED Provider Note    CHIEF COMPLAINT  Chief Complaint   Patient presents with   • Weakness       HPI  Sebastian Flores is a 88 y.o. male who presents for evaluation of generalized decline in health over the past 8 months but more rapidly in the past 2 weeks, this is associated with some intermittent right-sided leg weakness.  The patient has resided in a nursing home over the past 8 months, he was transferred to a memory care type of facility several months ago, the son reports since then he has just become generally weak and seems to have lost the will to live.  The patient over the past couple weeks has had intermittent right-sided weakness of his leg, at first was attributed to a possible stroke versus too much physical therapy, he regained his normal baseline strength in that extremity only to briefly lose it again.  He seems to be eating less than usual, he is been coughing with a productive cough, EMS reports that he was actually hypoxic with an O2 sat in the 80s.  In January of this year the patient signed a POLST form indicating that he is a DNR and wishes comfort measures only.  The son also reports that the patient asked him to look into the physician assisted suicide options in [place].    REVIEW OF SYSTEMS  Negative for fever, rash, chest pain, abdominal pain, vomiting, diarrhea back pain. All other systems are negative.     PAST MEDICAL HISTORY  Past Medical History:   Diagnosis Date   • Dementia    • Depression    • Hypothyroid    • Skin appendage carcinoma    • Thyroid follicular adenoma        FAMILY HISTORY  History reviewed. No pertinent family history.    SOCIAL HISTORY  Social History   Substance Use Topics   • Smoking status: Former Smoker     Types: Cigarettes   • Smokeless tobacco: Never Used   • Alcohol use No       SURGICAL HISTORY  Past Surgical History:   Procedure Laterality Date   • THYROIDECTOMY         CURRENT MEDICATIONS  I personally reviewed the medication list in the charting  "documentation.     ALLERGIES  No Known Allergies    MEDICAL RECORD  I have reviewed patient's medical record and pertinent results are listed above.      PHYSICAL EXAM  VITAL SIGNS: /71   Pulse 76   Temp 37.1 °C (98.8 °F)   Resp 16   Ht 1.803 m (5' 11\")   Wt 83.9 kg (185 lb)   SpO2 91%   BMI 25.80 kg/m²    Constitutional: Elderly, frail, appears ill  HENT: Mucus membranes dry.    Eyes: No scleral icterus. Normal conjunctiva   Neck: Supple, comfortable, nonpainful range of motion.   Cardiovascular: Regular heart rate and rhythm.   Thorax & Lungs: Bilateral basilar crackles, no wheezing, good air movement   Abdomen: Soft, with no tenderness  Skin: Warm, dry. No rash appreciated  Extremities/Musculoskeletal: No sign of trauma. No asymmetric calf tenderness, erythema or edema. Normal range of motion.  2+ symmetric lower extremity pitting edema  Neurologic: Patient is somnolent but arousable, difficult to assess orientation, he has a hard time following commands for a thorough neurologic evaluation but he does not have any obvious unilateral motor deficits    DIAGNOSTIC STUDIES / PROCEDURES    LABS  Results for orders placed or performed during the hospital encounter of 10/22/18   CBC w/ Differential   Result Value Ref Range    WBC 9.1 4.8 - 10.8 K/uL    RBC 4.86 4.70 - 6.10 M/uL    Hemoglobin 15.1 14.0 - 18.0 g/dL    Hematocrit 44.5 42.0 - 52.0 %    MCV 91.6 81.4 - 97.8 fL    MCH 31.1 27.0 - 33.0 pg    MCHC 33.9 33.7 - 35.3 g/dL    RDW 47.9 35.9 - 50.0 fL    Platelet Count 203 164 - 446 K/uL    MPV 9.9 9.0 - 12.9 fL    Neutrophils-Polys 62.00 44.00 - 72.00 %    Lymphocytes 9.10 (L) 22.00 - 41.00 %    Monocytes 20.40 (H) 0.00 - 13.40 %    Eosinophils 7.20 (H) 0.00 - 6.90 %    Basophils 0.90 0.00 - 1.80 %    Immature Granulocytes 0.40 0.00 - 0.90 %    Nucleated RBC 0.00 /100 WBC    Neutrophils (Absolute) 5.64 1.82 - 7.42 K/uL    Lymphs (Absolute) 0.83 (L) 1.00 - 4.80 K/uL    Monos (Absolute) 1.85 (H) 0.00 - " 0.85 K/uL    Eos (Absolute) 0.65 (H) 0.00 - 0.51 K/uL    Baso (Absolute) 0.08 0.00 - 0.12 K/uL    Immature Granulocytes (abs) 0.04 0.00 - 0.11 K/uL    NRBC (Absolute) 0.00 K/uL   Complete Metabolic Panel (CMP)   Result Value Ref Range    Sodium 139 135 - 145 mmol/L    Potassium 3.9 3.6 - 5.5 mmol/L    Chloride 106 96 - 112 mmol/L    Co2 25 20 - 33 mmol/L    Anion Gap 8.0 0.0 - 11.9    Glucose 97 65 - 99 mg/dL    Bun 17 8 - 22 mg/dL    Creatinine 1.10 0.50 - 1.40 mg/dL    Calcium 8.7 8.5 - 10.5 mg/dL    AST(SGOT) 15 12 - 45 U/L    ALT(SGPT) 11 2 - 50 U/L    Alkaline Phosphatase 68 30 - 99 U/L    Total Bilirubin 0.7 0.1 - 1.5 mg/dL    Albumin 3.2 3.2 - 4.9 g/dL    Total Protein 6.2 6.0 - 8.2 g/dL    Globulin 3.0 1.9 - 3.5 g/dL    A-G Ratio 1.1 g/dL   Troponin STAT   Result Value Ref Range    Troponin I 0.18 (H) 0.00 - 0.04 ng/mL   Btype Natriuretic Peptide   Result Value Ref Range    B Natriuretic Peptide 69 0 - 100 pg/mL   PT/INR   Result Value Ref Range    PT 14.8 (H) 12.0 - 14.6 sec    INR 1.14 (H) 0.87 - 1.13   APTT   Result Value Ref Range    APTT 26.6 24.7 - 36.0 sec   TSH (for screening thyroid dysfunction)   Result Value Ref Range    TSH 12.870 (H) 0.380 - 5.330 uIU/mL   ESTIMATED GFR   Result Value Ref Range    GFR If African American >60 >60 mL/min/1.73 m 2    GFR If Non African American >60 >60 mL/min/1.73 m 2        RADIOLOGY  DX-CHEST-PORTABLE (1 VIEW)   Final Result      Probable small left pleural effusion.      Linear opacity in the right midlung likely represents atelectasis or scarring.      Atherosclerotic plaque.         CT-HEAD W/O   Final Result      No acute intracranial abnormality is identified.      Atrophy      There are periventricular and subcortical white matter changes present.  This finding is nonspecific and could be from previous small vessel ischemia, demyelination, or gliosis.      Mild paranasal sinus disease.               COURSE & MEDICAL DECISION MAKING  I have reviewed any  medical record information, laboratory studies and radiographic results as noted above.  Differential diagnoses includes: Pneumonia, intra-cranial hemorrhage, dehydration, electrolyte abnormalities, ACS, UTI    Encounter Summary: This is a 88 y.o. male with progressive decline in health rapidly progressing over 2 weeks, intermittent focal weakness but none obvious today, has been coughing with a productive cough and is hypoxic on room air, he has dementia, resides in a memory care facility, he is here with his son who is the power of .  The patient has expressed wishes to not live anymore, he has a POLST indicating a status of DNR as well as comfort measures only.  The son is essentially looking for guidance as to the best way to proceed from here, I think at this point I will obtain a chest x-ray, head CT and some blood work and urinalysis to provide the son with better information to make further decisions although I am also going to consult hospice to further educate the son so he can make a decision ------ blood work reveals a bumped troponin and high TSH, although chest x-ray is not consistent with pneumonia I do think he could be developing pneumonia clinically.  Hospice team has come to the bedside to evaluate the patient and discussed the role with the son.  After extensive conversation with the patient's son, we have decided to honor the patient's wishes and the POLST and discharge the patient home with no treatment for any of these abnormalities, other than oxygen which social work has arranged.  I discussed the plan moving forward with the nursing home, I spoke to Yamini at the nursing facility he states that they have hospice patients there and are well equipped to honor the patient's request for comfort care measures only.  The hospice team is going to follow-up with the patient there.  Patient is stable and appropriate for discharge    DISPOSITION: Discharge home in guarded condition      FINAL  IMPRESSION  1. Weakness    2. Elevated troponin I level    3. Hypothyroidism, unspecified type    4. Comfort measures only status           This dictation was created using voice recognition software. The accuracy of the dictation is limited to the abilities of the software. I expect there may be some errors of grammar and possibly content. The nursing notes were reviewed and certain aspects of this information were incorporated into this note.    Electronically signed by: Farooq Banda, 10/22/2018 2:35 PM

## 2018-10-22 NOTE — ED TRIAGE NOTES
Patient to ED from Memory care [facility] [address] [id number].      911 was called because staff was concerned over right arm drift and leaning to right side. They noticed this at 1230 today. Per son Pt developed left facial droop over a month, increased weakness and decrease in alertness. Pt has been leaning to side when sitting up for at least 3 days. Pt reports he is feeling very tired. No arm drift, pre-existing facial droop noted. Bilateral LE edema, moist cough present. Pt is incont of urine and stools. He does NOT wear home 02. He was found to be hypoxic on RA by EMS 88%. He has been placed on 3 lpm via NC.   Pt arrives with POLST that is for comfort measures only. Son, Catalino, at bedside is POA.

## 2018-10-23 NOTE — FACE TO FACE
Face to Face Note  -  Durable Medical Equipment    Farooq Banda M.D. - NPI: [id number]  I certify that this patient is under my care and that they have had a durable medical equipment(DME)face to face encounter by myself that meets the physician DME face-to-face encounter requirements with this patient on:    Date of encounter:   Patient:                    MRN:                       YOB: 2018  Sebastian Flores  [id number]  12/16/1929     The encounter with the patient was in whole, or in part, for the following medical condition, which is the primary reason for durable medical equipment:  Other - Hypoxia, likely pneumonia    I certify that, based on my findings, the following durable medical equipment is medically necessary:  Oxygen.    HOME O2 Saturation Measurements:(Values must be present for Home Oxygen orders)         ,     ,         My Clinical findings support the need for the above equipment due to:  Hypoxia    Supporting Symptoms: Shortness of breath    ------------------------------------------------------------------------------------------------------------------  I certify the face to face encounter for this home care referral meets the CMS requirements and the encounter/clinical assessment with the patient was, in whole, or in part, for the medical condition(s) listed above, which is the primary reason for home health care. Based on my clinical findings: the service(s) are medically necessary, support the need for home health care, and the homebound criteria are met.  I certify that this patient has had a face to face encounter by myself.  Farooq Banda M.D. - DANITZAI: [id number]    *Debility, frailty and advanced age in the absence of an acute deterioration or exacerbation of a condition do not qualify a patient for home health.

## 2018-10-23 NOTE — ED NOTES
organizing for oxygen to be delivered to the ED.  Once oxygen is delivered we can arrange transport to take patient back home

## 2018-10-23 NOTE — ED NOTES
Received report from Devon. Assumed care of pt at this time. Pt alert and oriented x4, GCS 15, resp even and nl. Pt on full cardiac monitor. Bed locked in low position, call light within reach. Denies needs at this time. Family at bedside, informed we are waiting on oxygen tank then a [facility] will be called for transportation. Will continue to monitor.

## 2018-10-23 NOTE — DISCHARGE PLANNING
Medical Social Work    MSW received a call from bedside RN that pt's oxygen has arrived from [facility] and pt is ready for [facility] transport back to [facility] ([address]  MSW faxed oxygen order, face to face, facesheet, and approved service to [facility].  MSW faxed PCS and facesheet to [facility] and made follow up phone call to [facility] to arrange transport for 2030.  MSW updated bedside RN of transport time.    Plan: Pt to return to [facility] via [facility] at 2030.

## 2018-10-23 NOTE — ED NOTES
This RN and RN Barrie at bedside for incontinence care. New diaper applied, new linens provided.   [facility] present to transfer pt and pt oxygen tank to facility. Informed hospice will speak with pt and family tomorrow.  Pt explained discharge instructions, pt and pt family verbalized understanding of discharge instructions. Instructions and pink DNR paperwork provided to [facility]. Pt alert and oriented x4, gcs15, VSS, resp even and nl. IV catheter removed, catheter intact, pressure and guaze applied. Pt arrives to exit via [facility] cart.

## 2018-10-23 NOTE — ED NOTES
This RN  Called [facility], requesting to speak with RN for report. This RN spoke with laura Wong, informed this pt, Sebastian Flores, is in transport to facility with plan to speak with hospice tomorrow and begin home oxygen today. Oxygen tank sent with pt family. Judith verbalized understanding.

## 2018-10-23 NOTE — DISCHARGE PLANNING
Per ERP, the pts POLST indicates that he does not want to be admitted, however the pt is in need of new O2 for comfort measures. Per RN and ERP, hospice is not set up as they are meeting with memory care tomorrow to establish services. Due to Medicare laws, the pt would have to be admitted for new O2 establishment as hospice has not been set up. Per CCS, if SS pays for O2 pt can avoid admission. Due to this, SW has completed an approved service form totaling $150.81 for 1 mo O2 through [employer]. ERP and RN updated. ERP to place O2 orders. SW spoke with pts son who is agreeable with this plan. PM SW updated and will follow up with O2 order and F2F upon population. Pt to d/c home via [facility].

## 2018-10-23 NOTE — DISCHARGE PLANNING
Per ER JI Lozada request, call placed to [facility] left message for return call.  Call placed to [employer], spoke with Ormond, states cost will be $150.81 and he will deliver portable to beside,  Per Ormond he should be bedside in about 1 hour.  ER JI Lozada advised, she will complete Approved Service along with order and fax it to [employer].  I have requested she fax a copy of Approved Services to Case Management as well.

## 2019-01-01 ENCOUNTER — HOME CARE VISIT (OUTPATIENT)
Dept: HOSPICE | Facility: HOSPICE | Age: 84
End: 2019-01-01
Payer: MEDICARE

## 2019-01-01 ENCOUNTER — APPOINTMENT (OUTPATIENT)
Dept: HOSPICE | Facility: HOSPICE | Age: 84
End: 2019-01-01
Payer: MEDICARE

## 2019-01-01 ENCOUNTER — HOSPITAL ENCOUNTER (OUTPATIENT)
Facility: MEDICAL CENTER | Age: 84
End: 2019-02-11
Attending: INTERNAL MEDICINE
Payer: COMMERCIAL

## 2019-01-01 VITALS — RESPIRATION RATE: 18 BRPM | HEART RATE: 80 BPM | SYSTOLIC BLOOD PRESSURE: 110 MMHG | DIASTOLIC BLOOD PRESSURE: 70 MMHG

## 2019-01-01 VITALS — RESPIRATION RATE: 18 BRPM | HEART RATE: 78 BPM

## 2019-01-01 VITALS — DIASTOLIC BLOOD PRESSURE: 62 MMHG | HEART RATE: 16 BPM | SYSTOLIC BLOOD PRESSURE: 100 MMHG

## 2019-01-01 VITALS — RESPIRATION RATE: 18 BRPM | SYSTOLIC BLOOD PRESSURE: 100 MMHG | HEART RATE: 80 BPM | DIASTOLIC BLOOD PRESSURE: 62 MMHG

## 2019-01-01 VITALS — HEART RATE: 66 BPM | RESPIRATION RATE: 16 BRPM | SYSTOLIC BLOOD PRESSURE: 100 MMHG | DIASTOLIC BLOOD PRESSURE: 54 MMHG

## 2019-01-01 VITALS — HEART RATE: 78 BPM | SYSTOLIC BLOOD PRESSURE: 102 MMHG | RESPIRATION RATE: 18 BRPM | DIASTOLIC BLOOD PRESSURE: 62 MMHG

## 2019-01-01 VITALS — HEART RATE: 62 BPM | SYSTOLIC BLOOD PRESSURE: 102 MMHG | DIASTOLIC BLOOD PRESSURE: 64 MMHG | RESPIRATION RATE: 18 BRPM

## 2019-01-01 VITALS — HEART RATE: 78 BPM | RESPIRATION RATE: 18 BRPM | DIASTOLIC BLOOD PRESSURE: 70 MMHG | SYSTOLIC BLOOD PRESSURE: 108 MMHG

## 2019-01-01 VITALS — RESPIRATION RATE: 16 BRPM | HEART RATE: 72 BPM

## 2019-01-01 VITALS — RESPIRATION RATE: 16 BRPM

## 2019-01-01 VITALS
BODY MASS INDEX: 27.48 KG/M2 | SYSTOLIC BLOOD PRESSURE: 110 MMHG | HEART RATE: 80 BPM | WEIGHT: 197 LBS | DIASTOLIC BLOOD PRESSURE: 70 MMHG | RESPIRATION RATE: 18 BRPM

## 2019-01-01 VITALS — SYSTOLIC BLOOD PRESSURE: 110 MMHG | HEART RATE: 72 BPM | DIASTOLIC BLOOD PRESSURE: 60 MMHG | RESPIRATION RATE: 18 BRPM

## 2019-01-01 VITALS — RESPIRATION RATE: 18 BRPM | HEART RATE: 78 BPM | SYSTOLIC BLOOD PRESSURE: 130 MMHG | DIASTOLIC BLOOD PRESSURE: 72 MMHG

## 2019-01-01 VITALS — SYSTOLIC BLOOD PRESSURE: 120 MMHG | HEART RATE: 80 BPM | RESPIRATION RATE: 18 BRPM | DIASTOLIC BLOOD PRESSURE: 70 MMHG

## 2019-01-01 VITALS — RESPIRATION RATE: 18 BRPM

## 2019-01-01 VITALS — SYSTOLIC BLOOD PRESSURE: 100 MMHG | HEART RATE: 80 BPM | RESPIRATION RATE: 18 BRPM | DIASTOLIC BLOOD PRESSURE: 60 MMHG

## 2019-01-01 VITALS — HEART RATE: 80 BPM | RESPIRATION RATE: 20 BRPM

## 2019-01-01 VITALS — RESPIRATION RATE: 16 BRPM | HEART RATE: 100 BPM

## 2019-01-01 VITALS — HEART RATE: 78 BPM | DIASTOLIC BLOOD PRESSURE: 70 MMHG | RESPIRATION RATE: 18 BRPM | SYSTOLIC BLOOD PRESSURE: 106 MMHG

## 2019-01-01 VITALS — SYSTOLIC BLOOD PRESSURE: 110 MMHG | HEART RATE: 90 BPM | RESPIRATION RATE: 18 BRPM | DIASTOLIC BLOOD PRESSURE: 62 MMHG

## 2019-01-01 VITALS — DIASTOLIC BLOOD PRESSURE: 58 MMHG | RESPIRATION RATE: 16 BRPM | SYSTOLIC BLOOD PRESSURE: 100 MMHG | HEART RATE: 60 BPM

## 2019-01-01 VITALS — RESPIRATION RATE: 18 BRPM | HEART RATE: 80 BPM

## 2019-01-01 VITALS — HEART RATE: 80 BPM | RESPIRATION RATE: 24 BRPM | DIASTOLIC BLOOD PRESSURE: 64 MMHG | SYSTOLIC BLOOD PRESSURE: 100 MMHG

## 2019-01-01 LAB
AMORPH CRY #/AREA URNS HPF: PRESENT /HPF
APPEARANCE UR: ABNORMAL
BACTERIA #/AREA URNS HPF: NEGATIVE /HPF
BILIRUB UR QL STRIP.AUTO: ABNORMAL
C DIFF DNA SPEC QL NAA+PROBE: NEGATIVE
C DIFF TOX GENS STL QL NAA+PROBE: NEGATIVE
CAOX CRY #/AREA URNS HPF: ABNORMAL /HPF
COLOR UR: YELLOW
EPI CELLS #/AREA URNS HPF: ABNORMAL /HPF
GLUCOSE UR STRIP.AUTO-MCNC: NEGATIVE MG/DL
KETONES UR STRIP.AUTO-MCNC: ABNORMAL MG/DL
LEUKOCYTE ESTERASE UR QL STRIP.AUTO: NEGATIVE
MICRO URNS: ABNORMAL
MUCOUS THREADS #/AREA URNS HPF: ABNORMAL /HPF
NITRITE UR QL STRIP.AUTO: NEGATIVE
PH UR STRIP.AUTO: 6 [PH]
PROT UR QL STRIP: NEGATIVE MG/DL
RBC # URNS HPF: ABNORMAL /HPF
RBC UR QL AUTO: NEGATIVE
SP GR UR STRIP.AUTO: >=1.03
UROBILINOGEN UR STRIP.AUTO-MCNC: 1 MG/DL
WBC #/AREA URNS HPF: ABNORMAL /HPF

## 2019-01-01 PROCEDURE — 6650990 HC HOSPICE AND HOME CARE RX REV CODE 0250

## 2019-01-01 PROCEDURE — G0155 HHCP-SVS OF CSW,EA 15 MIN: HCPCS

## 2019-01-01 PROCEDURE — 6650990 HC HOSPICE AND HOME CARE RX REV CODE 0250: Performed by: INTERNAL MEDICINE

## 2019-01-01 PROCEDURE — G0156 HHCP-SVS OF AIDE,EA 15 MIN: HCPCS

## 2019-01-01 PROCEDURE — S9126 HOSPICE CARE, IN THE HOME, P: HCPCS

## 2019-01-01 PROCEDURE — G0299 HHS/HOSPICE OF RN EA 15 MIN: HCPCS

## 2019-01-01 PROCEDURE — 81001 URINALYSIS AUTO W/SCOPE: CPT

## 2019-01-01 PROCEDURE — 87493 C DIFF AMPLIFIED PROBE: CPT

## 2019-01-01 RX ADMIN — MORPHINE SULFATE 20 MG: 100 SOLUTION ORAL at 11:11

## 2019-01-01 RX ADMIN — MORPHINE SULFATE 5 MG: 100 SOLUTION ORAL at 11:11

## 2019-01-01 RX ADMIN — LORAZEPAM 1 MG: 2 INJECTION INTRAMUSCULAR at 11:11

## 2019-01-01 SDOH — ECONOMIC STABILITY: HOUSING INSECURITY: UNSAFE APPLIANCES: 0

## 2019-01-01 SDOH — ECONOMIC STABILITY: HOUSING INSECURITY: UNSAFE COOKING RANGE AREA: 0

## 2019-01-01 SDOH — ECONOMIC STABILITY: HOUSING INSECURITY: EVIDENCE OF SMOKING MATERIAL: 0

## 2019-01-01 ASSESSMENT — ENCOUNTER SYMPTOMS
SLEEP QUALITY: ADEQUATE
DESCRIPTION OF MEMORY LOSS: SHORT TERM
BOWEL INCONTINENCE: 1
SHORTNESS OF BREATH: 1
FATIGUES EASILY: 1
STOOL FREQUENCY: DAILY
SOMNOLENCE: 1
LAST BOWEL MOVEMENT: 65076
DYSPNEA ON EXERTION: 1
DYSPNEA ACTIVITY LEVEL: AT REST
LOWER EXTREMITY EDEMA: 1
BOWEL INCONTINENCE: 1
DYSPNEA ON EXERTION: 1
LAST BOWEL MOVEMENT: 65044
BOWEL INCONTINENCE: 1
LOWER EXTREMITY EDEMA: 1
FORGETFULNESS: 1
FATIGUES EASILY: 1
DESCRIPTION OF MEMORY LOSS: SHORT TERM
SOMNOLENCE: 1
LOWER EXTREMITY EDEMA: 1
LAST BOWEL MOVEMENT: 65041
STOOL FREQUENCY: DAILY
FATIGUE: 1
BOWEL INCONTINENCE: 1
LOWER EXTREMITY EDEMA: 1
BOWEL INCONTINENCE: 1
FATIGUES EASILY: 1
FORGETFULNESS: 1
SOMNOLENCE: 1
SHORTNESS OF BREATH: 1
FORGETFULNESS: 1
DYSPNEA ON EXERTION: 1
BOWEL INCONTINENCE: 1
LOWER EXTREMITY EDEMA: 1
DYSPNEA ON EXERTION: 1
SLEEP QUALITY: ADEQUATE
SHORTNESS OF BREATH: 1
DESCRIPTION OF MEMORY LOSS: SHORT TERM
SLEEP QUALITY: ADEQUATE
LAST BOWEL MOVEMENT: 65072
BOWEL INCONTINENCE: 1
DRY SKIN: 1
FATIGUES EASILY: 1
SLEEP QUALITY: ADEQUATE
DESCRIPTION OF MEMORY LOSS: IMMEDIATE
BOWEL INCONTINENCE: 1
SHORTNESS OF BREATH: 1
DIARRHEA: 1
FATIGUES EASILY: 1
FORGETFULNESS: 1
DYSPNEA ACTIVITY LEVEL: AT REST
LAST BOWEL MOVEMENT: 65048
FORGETFULNESS: 1
DESCRIPTION OF MEMORY LOSS: SHORT TERM
FATIGUES EASILY: 1
FATIGUE: 1
STOOL FREQUENCY: DAILY
LOWER EXTREMITY EDEMA: 1
DYSPNEA ACTIVITY LEVEL: AT REST
DIARRHEA: 1
DESCRIPTION OF MEMORY LOSS: SHORT TERM
DESCRIPTION OF MEMORY LOSS: IMMEDIATE
FORGETFULNESS: 1
LAST BOWEL MOVEMENT: 65082
DYSPNEA ON EXERTION: 1
SLEEP QUALITY: ADEQUATE
FORGETFULNESS: 1
FATIGUE: 1
LOWER EXTREMITY EDEMA: 1
LOWER EXTREMITY EDEMA: 1
BOWEL INCONTINENCE: 1
FORGETFULNESS: 1
FATIGUES EASILY: 1
FATIGUE: 1
LAST BOWEL MOVEMENT: 65019
SLEEP QUALITY: ADEQUATE
SLEEP QUALITY: FAIR
DYSPNEA ON EXERTION: 1
DESCRIPTION OF MEMORY LOSS: IMMEDIATE
FATIGUES EASILY: 1
LAST BOWEL MOVEMENT: 65093
DESCRIPTION OF MEMORY LOSS: SHORT TERM
SLEEP QUALITY: ADEQUATE
FATIGUES EASILY: 1
STOOL FREQUENCY: LESS THAN DAILY
DESCRIPTION OF MEMORY LOSS: SHORT TERM
SHORTNESS OF BREATH: 1
STOOL FREQUENCY: DAILY
DIARRHEA: 1
DESCRIPTION OF MEMORY LOSS: IMMEDIATE
SOMNOLENCE: 1
BOWEL INCONTINENCE: 1
DIARRHEA: 1
FORGETFULNESS: 1
BOWEL INCONTINENCE: 1
LAST BOWEL MOVEMENT: 65069
LOWER EXTREMITY EDEMA: 1
FATIGUES EASILY: 1
BOWEL INCONTINENCE: 1
DIARRHEA: 1
DYSPNEA ACTIVITY LEVEL: AT REST
SHORTNESS OF BREATH: 1
FATIGUES EASILY: 1
DESCRIPTION OF MEMORY LOSS: IMMEDIATE
LAST BOWEL MOVEMENT: 65065
FORGETFULNESS: 1
LOWER EXTREMITY EDEMA: 1
FORGETFULNESS: 1
STOOL FREQUENCY: DAILY
FATIGUES EASILY: 1
DYSPNEA ON EXERTION: 1
STOOL FREQUENCY: DAILY
SOMNOLENCE: 1
SOMNOLENCE: 1
FATIGUES EASILY: 1
DIARRHEA: 1
DYSPNEA ON EXERTION: 1
FORGETFULNESS: 1
FATIGUES EASILY: 1
FATIGUES EASILY: 1
DIARRHEA: 1
LOWER EXTREMITY EDEMA: 1
DYSPNEA ON EXERTION: 1
DIARRHEA: 1
DIARRHEA: 1
LOWER EXTREMITY EDEMA: 1
FATIGUE: 1
FATIGUE: 1
DESCRIPTION OF MEMORY LOSS: SHORT TERM
SHORTNESS OF BREATH: 1
DYSPNEA ON EXERTION: 1
DESCRIPTION OF MEMORY LOSS: SHORT TERM
LAST BOWEL MOVEMENT: 65051
BOWEL INCONTINENCE: 1
FATIGUE: 1
SOMNOLENCE: 1
FATIGUE: 1
STOOL FREQUENCY: DAILY
DIARRHEA: 1
DIARRHEA: 1
STOOL FREQUENCY: DAILY
BOWEL INCONTINENCE: 1
SOMNOLENCE: 1
DYSPNEA ACTIVITY LEVEL: AT REST
DYSPNEA ON EXERTION: 1
DESCRIPTION OF MEMORY LOSS: IMMEDIATE
SLEEP QUALITY: ADEQUATE
LOWER EXTREMITY EDEMA: 1
STOOL FREQUENCY: LESS THAN DAILY
DYSPNEA ON EXERTION: 1
LAST BOWEL MOVEMENT: 65055
SHORTNESS OF BREATH: 1
DYSPNEA ON EXERTION: 1
DESCRIPTION OF MEMORY LOSS: SHORT TERM
LOWER EXTREMITY EDEMA: 1
SLEEP QUALITY: ADEQUATE
STOOL FREQUENCY: DAILY
FATIGUE: 1
LAST BOWEL MOVEMENT: 65033
FATIGUES EASILY: 1
FATIGUE: 1
DESCRIPTION OF MEMORY LOSS: IMMEDIATE
SLEEP QUALITY: ADEQUATE
FATIGUES EASILY: 1
FATIGUES EASILY: 1
SOMNOLENCE: 1
LOWER EXTREMITY EDEMA: 1
SLEEP QUALITY: ADEQUATE
BOWEL INCONTINENCE: 1
STOOL FREQUENCY: DAILY
DYSPNEA ACTIVITY LEVEL: AT REST
DESCRIPTION OF MEMORY LOSS: SHORT TERM
LAST BOWEL MOVEMENT: 65023
LOWER EXTREMITY EDEMA: 1
DYSPNEA ACTIVITY LEVEL: AT REST
LOWER EXTREMITY EDEMA: 1
SLEEP QUALITY: ADEQUATE
STOOL FREQUENCY: LESS THAN DAILY
DRY SKIN: 1
LAST BOWEL MOVEMENT: 65075
LOWER EXTREMITY EDEMA: 1
DYSPNEA ON EXERTION: 1
FATIGUE: 1
FATIGUE: 1
BOWEL INCONTINENCE: 1
SLEEP QUALITY: ADEQUATE
FATIGUE: 1
BOWEL INCONTINENCE: 1
SLEEP QUALITY: ADEQUATE
SHORTNESS OF BREATH: 1
DYSPNEA ACTIVITY LEVEL: AT REST
FATIGUE: 1
STOOL FREQUENCY: LESS THAN DAILY
LOWER EXTREMITY EDEMA: 1
DIARRHEA: 1
SOMNOLENCE: 1
DESCRIPTION OF MEMORY LOSS: SHORT TERM
FATIGUES EASILY: 1
STOOL FREQUENCY: DAILY
SOMNOLENCE: 1
FORGETFULNESS: 1
SOMNOLENCE: 1
BOWEL INCONTINENCE: 1
SHORTNESS OF BREATH: 1
FATIGUES EASILY: 1
FORGETFULNESS: 1
FATIGUE: 1
SHORTNESS OF BREATH: 1
SOMNOLENCE: 1
STOOL FREQUENCY: DAILY
BOWEL INCONTINENCE: 1
FORGETFULNESS: 1
SOMNOLENCE: 1
FATIGUE: 1
DYSPNEA ACTIVITY LEVEL: AT REST
AGITATION: 1
LAST BOWEL MOVEMENT: 65089
DYSPNEA ACTIVITY LEVEL: AT REST
BOWEL INCONTINENCE: 1
LAST BOWEL MOVEMENT: 65062
LOWER EXTREMITY EDEMA: 1
STOOL FREQUENCY: DAILY
DYSPNEA ACTIVITY LEVEL: AT REST
SOMNOLENCE: 1
SHORTNESS OF BREATH: 1

## 2019-01-01 ASSESSMENT — SOCIAL DETERMINANTS OF HEALTH (SDOH)
ACTIVE STRESSOR - LOSS OF CONTROL: 1
ACTIVE STRESSOR - NO STRESS FACTORS: 1
ACTIVE STRESSOR - HEALTH CHANGES: 1
ACTIVE STRESSOR - EXPRESSED EMOTIONAL NEED: 1
ACTIVE STRESSOR - HEALTH CHANGES: 1
ACTIVE STRESSOR - LOSS OF CONTROL: 1
ACTIVE STRESSOR - HEALTH CHANGES: 1
IS CONCERNED ABOUT INCOME: Y
ACTIVE STRESSOR - EXPRESSED EMOTIONAL NEED: 1
ACTIVE STRESSOR - HEALTH CHANGES: 1

## 2019-01-01 ASSESSMENT — ACTIVITIES OF DAILY LIVING (ADL)
MONEY MANAGEMENT (EXPENSES/BILLS): TOTALLY DEPENDENT
MONEY MANAGEMENT (EXPENSES/BILLS): TOTALLY DEPENDENT

## 2019-03-25 ENCOUNTER — APPOINTMENT (OUTPATIENT)
Dept: HOSPICE | Facility: HOSPICE | Age: 84
End: 2019-03-25
Payer: MEDICARE

## 2019-03-25 ENCOUNTER — HOME CARE VISIT (OUTPATIENT)
Dept: HOSPICE | Facility: HOSPICE | Age: 84
End: 2019-03-25
Payer: MEDICARE

## 2019-03-28 ENCOUNTER — APPOINTMENT (OUTPATIENT)
Dept: HOSPICE | Facility: HOSPICE | Age: 84
End: 2019-03-28
Payer: MEDICARE